# Patient Record
Sex: MALE | Race: WHITE
[De-identification: names, ages, dates, MRNs, and addresses within clinical notes are randomized per-mention and may not be internally consistent; named-entity substitution may affect disease eponyms.]

---

## 2019-07-16 ENCOUNTER — HOSPITAL ENCOUNTER (EMERGENCY)
Dept: HOSPITAL 91 - FTE | Age: 34
LOS: 1 days | Discharge: HOME | End: 2019-07-17
Payer: COMMERCIAL

## 2019-07-16 ENCOUNTER — HOSPITAL ENCOUNTER (EMERGENCY)
Dept: HOSPITAL 10 - FTE | Age: 34
LOS: 1 days | Discharge: HOME | End: 2019-07-17
Payer: COMMERCIAL

## 2019-07-16 VITALS
BODY MASS INDEX: 28.3 KG/M2 | WEIGHT: 186.73 LBS | BODY MASS INDEX: 28.3 KG/M2 | WEIGHT: 186.73 LBS | HEIGHT: 68 IN | HEIGHT: 68 IN

## 2019-07-16 DIAGNOSIS — R05: ICD-10-CM

## 2019-07-16 DIAGNOSIS — Z87.891: ICD-10-CM

## 2019-07-16 DIAGNOSIS — R10.30: Primary | ICD-10-CM

## 2019-07-16 PROCEDURE — 71046 X-RAY EXAM CHEST 2 VIEWS: CPT

## 2019-07-16 PROCEDURE — 96372 THER/PROPH/DIAG INJ SC/IM: CPT

## 2019-07-16 PROCEDURE — 99284 EMERGENCY DEPT VISIT MOD MDM: CPT

## 2019-07-16 PROCEDURE — 94664 DEMO&/EVAL PT USE INHALER: CPT

## 2019-07-16 RX ADMIN — DEXAMETHASONE SODIUM PHOSPHATE 1 MG: 10 INJECTION, SOLUTION INTRAMUSCULAR; INTRAVENOUS at 21:32

## 2019-07-16 RX ADMIN — ALBUTEROL SULFATE 1 MG: 2.5 SOLUTION RESPIRATORY (INHALATION) at 21:38

## 2019-07-16 RX ADMIN — IPRATROPIUM BROMIDE 1 MG: 0.5 SOLUTION RESPIRATORY (INHALATION) at 21:39

## 2019-07-16 NOTE — ERD
ER Documentation


Chief Complaint


Chief Complaint





RIGHT GROIN PAIN RADIATES TO LOWER ABD X2DAYS; NO OTHER GI S/S





HPI


34-year-old male presenting to the ED for right groin pain cough x3 days.  


Patient denies any burning with urination denies any penile discharge lesions or


sores.  Patient denies any swelling.  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


participating in any physical activities and denies any trauma to the region.  


Patient states he does smoke vape pen on daily basis.  Patient denies any fever 


chills nausea vomiting.  Patient states the pain is worse when he coughs.  


Patient states the pain is a 6 out of 10.  Patient states this is never happened


to him before.  Patient states he has an allergy to penicillin.





ROS


All systems reviewed and are negative except as per history of present illness.





Medications


Home Meds


Active Scripts


Ibuprofen* (Motrin*) 600 Mg Tab, 600 MG PO Q6, #30 TAB


   Prov:VIRGILIO DUENAS PA-C         19


Guaifenesin-D-Methorphan Hb* (Guaifenesin* DM Syrup) 120 Ml Syrup, 10 ML PO Q4H 


PRN for COUGH for 7 Days, ML


   Prov:VIRGILIO DUENAS PA-C         19


Albuterol Sulfate* (Proair HFA*) 8.5 Gm Hfa.aer.ad, 2 PUFF INH Q4, #1 INHALER


   Prov:VIRGILIO DUENAS PA-C         19





Allergies


Allergies:  


Coded Allergies:  


     Penicillins (Verified  Allergy, Unknown, RASH, 19)





PMhx/Soc


Medical and Surgical Hx:  pt denies Medical Hx, pt denies Surgical Hx


Hx Alcohol Use:  Yes (OCCASIONAL)


Hx Substance Use:  No


Hx Tobacco Use:  Yes


Smoking Status:  Former smoker





FmHx


Family History:  No diabetes, No coronary disease, No other





Physical Exam


Vitals





Vital Signs


  Date      Temp  Pulse  Resp  B/P (MAP)   Pulse Ox  O2          O2 Flow    FiO2


Time                                                 Delivery    Rate


   19  98.0     55    20      125/85        95  Room Air


     00:15                           (98)


   19           65    20                    96                           21


     21:42


   19  98.2     70    18      138/91        96


     19:53                          (107)





Physical Exam


Const:   Moderate distress


.


Neck:               Full range of motion. No meningismus.


Resp:   Bilateral wheeze


Cardio:   Regular rate and rhythm, no murmurs


Abd:    Soft, non tender, non distended. Normal bowel sounds


Skin:   No petechiae or rashes


Back:   No midline or flank tenderness


   no inguinal hernia palpated on examination.  The testicles lie in the 


normal position and do not appear torsed, the patient has no pain to palpation. 


The patient is uncircumcised male and has no penile lesions or discharge.


Results 24 hrs





Current Medications


 Medications
   Dose
          Sig/Brittany
       Start Time
   Status  Last


 (Trade)       Ordered        Route
 PRN     Stop Time              Admin
Dose


                              Reason                                Admin


 Albuterol
     5 mg           ONCE  STAT
    19       DC           19


(Proventil
                   NEB
           21:23
                       21:38



0.083% (Neb))                                19 21:27


 Ipratropium
   1.5 mg         ONCE  STAT
    19       DC           19


Bromide
                      NEB
           21:23
                       21:39



(Atrovent                                    19 21:27


0.02%



(Neb))


                10 mg          ONCE  STAT
    19       DC           19


Dexamethasone                 IM
            21:23
                       21:32




  (Decadron)                                19 21:27


                1,000 mg       ONCE  STAT
    19       Cancel   



Acetaminophen                 PO
            21:33




  (Tylenol                                  19 21:34


Liquid



(Ped))








Procedures/MDM





Diagnostic imaging:


Read by radiologist DENVER Yanes,


PROCEDURE:   XR Chest. 


 


CLINICAL INDICATION:   Cough and wheezing 


 


TECHNIQUE:   PA and Lateral views of the chest were obtained. 


 


COMPARISON:   None. 


 


FINDINGS:


Cardiomediastinal silhouette is normal. Pulmonary vasculature is normal. Lungs 


and costophrenic angles are clear. Bones soft tissues are unremarkable.


 


IMPRESSION:


No evidence of acute cardiopulmonary disease.


 





Procedures:


Breathing treatment





Medications given in ER:


Decadron


Nebulized albuterol


Ipratropium





Patient tolerated medication well with no adverse reactions.  Patient reported 


improvement in pain.





Medical decision makin-year-old male presenting to the ED for groin pain.  Physical exam is 


unremarkable.  Patient had no signs of hernia, the testicles remained in normal 


position and do not appear torsed no pain on palpation.  There were no lesions 


or discharge coming from the penis.  The patient has remained asymptomatic 


without urinary symptoms or blood.  The patient had no CVA tenderness I have low


suspicion for testicular torsion, epididymitis, kidney stone, pyelonephritis.  


The patient does have an O2 saturation 96% and bilateral wheezing.  The patient 


states he smokes vape pen every single day.  The patient is afebrile.  The 


patient was given a breathing treatment and on further evaluation appears to be 


doing much better.  His chest x-ray is more consistent with viral illness.  At 


this time I have low suspicion for pneumonia.  Advised patient needs follow-up 


with primary care provider regarding this visit.  Advised patient symptoms 


worsen return to ER immediately.  Patient had no further questions upon 


discharge in agreement to the treatment plan.  Upon discharge patient states he 


is doing much better and no longer has pain.





Prescription for home:





Motrin


Guaifenesin


Pro Air





I have discussed with the patient proper use and  common side effects to expert 


with the medication .  I advised  the patient/family to speak with the 


pharmacist dispensing the medication to be advised of any potential drug 


interactions with other medication or supplements they may be taking. 











Discharge:


At this time, patient is stable for discharge and outpatient management.  I have


instructed the patient to follow-up with his\her primary care physician in 1 to 


2 days.  I have discussed with the patient the possibility of needing to see a 


specialist for further work-up and imaging studies if symptoms persist.  I have 


instructed the patient to promptly return to the ER for any new or worsening 


symptoms including increased pain, fever, nausea, vomiting, weakness or LOC.  


The patient and\or family expressed understanding of and agreement with this 


plan.  All questions were answered.  Home care instructions were provided.








Disclaimer:


Inadvertent spelling and grammatical errors are likely due to EHR\dictation 


software use and do not reflect on the overall quality of patient care.  Also, 


please note that the electronic time recorded on the note does not necessarily 


reflect the actual time of the patient encounter.





Departure


Condition:  VIRGILIO Bernstein PA-C               2019 22:07

## 2019-07-17 VITALS — RESPIRATION RATE: 20 BRPM | SYSTOLIC BLOOD PRESSURE: 125 MMHG | DIASTOLIC BLOOD PRESSURE: 85 MMHG | HEART RATE: 55 BPM
